# Patient Record
Sex: FEMALE | Race: WHITE | URBAN - METROPOLITAN AREA
[De-identification: names, ages, dates, MRNs, and addresses within clinical notes are randomized per-mention and may not be internally consistent; named-entity substitution may affect disease eponyms.]

---

## 2024-03-14 ENCOUNTER — APPOINTMENT (RX ONLY)
Dept: URBAN - METROPOLITAN AREA CLINIC 84 | Facility: CLINIC | Age: 44
Setting detail: DERMATOLOGY
End: 2024-03-14

## 2024-03-14 DIAGNOSIS — D22 MELANOCYTIC NEVI: ICD-10-CM

## 2024-03-14 DIAGNOSIS — L20.89 OTHER ATOPIC DERMATITIS: ICD-10-CM

## 2024-03-14 PROBLEM — D22.0 MELANOCYTIC NEVI OF LIP: Status: ACTIVE | Noted: 2024-03-14

## 2024-03-14 PROCEDURE — ? CONSULTATION FOR EXCISION

## 2024-03-14 PROCEDURE — ? PRESCRIPTION

## 2024-03-14 PROCEDURE — ? ADDITIONAL NOTES

## 2024-03-14 PROCEDURE — ? PRESCRIPTION MEDICATION MANAGEMENT

## 2024-03-14 PROCEDURE — 99204 OFFICE O/P NEW MOD 45 MIN: CPT

## 2024-03-14 PROCEDURE — ? COUNSELING

## 2024-03-14 RX ORDER — TRIAMCINOLONE ACETONIDE 1 MG/G
CREAM TOPICAL
Qty: 454 | Refills: 6 | Status: ERX | COMMUNITY
Start: 2024-03-14

## 2024-03-14 RX ADMIN — TRIAMCINOLONE ACETONIDE: 1 CREAM TOPICAL at 00:00

## 2024-03-14 ASSESSMENT — LOCATION SIMPLE DESCRIPTION DERM
LOCATION SIMPLE: LEFT FOREARM
LOCATION SIMPLE: RIGHT LIP
LOCATION SIMPLE: ABDOMEN

## 2024-03-14 ASSESSMENT — LOCATION DETAILED DESCRIPTION DERM
LOCATION DETAILED: SUBXIPHOID
LOCATION DETAILED: LEFT VENTRAL PROXIMAL FOREARM
LOCATION DETAILED: PERIUMBILICAL SKIN
LOCATION DETAILED: RIGHT INFERIOR VERMILION LIP

## 2024-03-14 ASSESSMENT — LOCATION ZONE DERM
LOCATION ZONE: LIP
LOCATION ZONE: ARM
LOCATION ZONE: TRUNK

## 2024-03-14 NOTE — PROCEDURE: ADDITIONAL NOTES
Additional Notes: Suggested removal with [id number]
Render Risk Assessment In Note?: no
Detail Level: Simple

## 2024-03-14 NOTE — PROCEDURE: COUNSELING
Cleanser Recommendations: Cetaphil Gentle Cleanser
Detail Level: Detailed
Moisturizer Recommendations: Vaseline/aquaphor
Detail Level: Simple

## 2024-03-14 NOTE — PROCEDURE: PRESCRIPTION MEDICATION MANAGEMENT
Initiate Treatment: Triamcinolone ointment twice daily for two weeks and PRN flares
Render In Strict Bullet Format?: No
Detail Level: Zone
Discontinue Regimen: All rx and otc fungus medication
No